# Patient Record
Sex: MALE | Race: WHITE | NOT HISPANIC OR LATINO | Employment: OTHER | URBAN - METROPOLITAN AREA
[De-identification: names, ages, dates, MRNs, and addresses within clinical notes are randomized per-mention and may not be internally consistent; named-entity substitution may affect disease eponyms.]

---

## 2019-09-13 ENCOUNTER — OFFICE VISIT (OUTPATIENT)
Dept: OPHTHALMOLOGY | Facility: CLINIC | Age: 69
End: 2019-09-13
Payer: COMMERCIAL

## 2019-09-13 DIAGNOSIS — Z01.01 ENCOUNTER FOR EXAMINATION OF EYES AND VISION WITH ABNORMAL FINDINGS: Primary | ICD-10-CM

## 2019-09-13 DIAGNOSIS — H35.3131 EARLY DRY STAGE NONEXUDATIVE AGE-RELATED MACULAR DEGENERATION OF BOTH EYES: ICD-10-CM

## 2019-09-13 DIAGNOSIS — H52.4 PRESBYOPIA: ICD-10-CM

## 2019-09-13 PROCEDURE — 92134 CPTRZ OPH DX IMG PST SGM RTA: CPT | Performed by: OPHTHALMOLOGY

## 2019-09-13 PROCEDURE — 92015 DETERMINE REFRACTIVE STATE: CPT | Performed by: OPHTHALMOLOGY

## 2019-09-13 PROCEDURE — 92004 COMPRE OPH EXAM NEW PT 1/>: CPT | Performed by: OPHTHALMOLOGY

## 2019-09-13 ASSESSMENT — VISUAL ACUITY
METHOD: SNELLEN - LINEAR
OS_SC: 20/25
OD_SC: 20/25
OS_SC+: +2

## 2019-09-13 ASSESSMENT — REFRACTION_MANIFEST
OS_AXIS: 171
OD_CYLINDER: +1.00
OD_AXIS: 180
OS_CYLINDER: +1.00
OD_ADD: +2.50
OS_ADD: +2.50
OD_SPHERE: -1.00
OS_SPHERE: -0.75

## 2019-09-13 ASSESSMENT — CONF VISUAL FIELD
OD_NORMAL: 1
OS_NORMAL: 1

## 2019-09-13 ASSESSMENT — TONOMETRY
OD_IOP_MMHG: 18
OS_IOP_MMHG: 20
IOP_METHOD: APPLANATION

## 2019-09-13 NOTE — PROGRESS NOTES
" Current Eye Medications:  None.      Subjective:  Comprehensive Eye Exam.  Patient complains of some gradual decrease in vision.  He occasionally has some floaters in his vision.  When looking at a horizontal line, he is aware of a \"dip\" in the middle, but this has been this way for many years.  He wears over-the-counter readers.  Both eyes are photophobic.  He is bothered at times by anxiety and notices his pupils become small and has a \"pressure feeling.\"  This is his first Comprehensive Eye Exam.   No family history of glaucoma or age related maculopathy..      Objective:  See Ophthalmology Exam.       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      ICD-10-CM    1. Encounter for examination of eyes and vision with abnormal findings Z01.01 REFRACTIVE STATUS   2. Presbyopia H52.4 REFRACTIVE STATUS   3. Early dry stage nonexudative age-related macular degeneration of both eyes H35.3131 EYE EXAM (SIMPLE-NONBILLABLE)     [facility] COMPUTERIZED OPHTHALMIC IMAGING RETINA        Plan:  Glasses Rx given - optional  Possible posterior vitreous detachment (sudden onset large floater and/or flashing lights) both eyes discussed.  Take a multiple vitamin or \"eye vitamin\" daily (AREDS2).  Protect your eyes outdoors from ultraviolet rays with sunglasses and/or brimmed hat.  Have spinach (cooked or raw), colorful fruits, walnuts, hazelnuts, almonds in your diet.  Monitor the vision in each eye weekly - call if any sudden persistent changes.  Baseline retinal OCT today.  Call in May 2020 for an appointment in September 2020 for Complete Exam    Dr. Yeboah [phone]      "

## 2019-09-13 NOTE — LETTER
"    9/13/2019         RE: Bahman Meeks  [address]  [facility] [address]        Dear Colleague,    Thank you for referring your patient, Bahman eMeks, to the [facility]. Please see a copy of my visit note below.     Current Eye Medications:  None.      Subjective:  Comprehensive Eye Exam.  Patient complains of some gradual decrease in vision.  He occasionally has some floaters in his vision.  When looking at a horizontal line, he is aware of a \"dip\" in the middle, but this has been this way for many years.  He wears over-the-counter readers.  Both eyes are photophobic.  He is bothered at times by anxiety and notices his pupils become small and has a \"pressure feeling.\"  This is his first Comprehensive Eye Exam.   No family history of glaucoma or age related maculopathy..      Objective:  See Ophthalmology Exam.       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      ICD-10-CM    1. Encounter for examination of eyes and vision with abnormal findings Z01.01 REFRACTIVE STATUS   2. Presbyopia H52.4 REFRACTIVE STATUS   3. Early dry stage nonexudative age-related macular degeneration of both eyes H35.3131 EYE EXAM (SIMPLE-NONBILLABLE)     HC COMPUTERIZED OPHTHALMIC IMAGING RETINA        Plan:  Glasses Rx given - optional  Possible posterior vitreous detachment (sudden onset large floater and/or flashing lights) both eyes discussed.  Take a multiple vitamin or \"eye vitamin\" daily (AREDS2).  Protect your eyes outdoors from ultraviolet rays with sunglasses and/or brimmed hat.  Have spinach (cooked or raw), colorful fruits, walnuts, hazelnuts, almonds in your diet.  Monitor the vision in each eye weekly - call if any sudden persistent changes.  Baseline retinal OCT today.  Call in May 2020 for an appointment in September 2020 for Complete Exam    Dr. Yeboah [phone]        Again, thank you for allowing me to participate in the care of your " patient.        Sincerely,        Freedom Yeboah MD

## 2019-09-15 PROBLEM — H35.3131 EARLY DRY STAGE NONEXUDATIVE AGE-RELATED MACULAR DEGENERATION OF BOTH EYES: Status: ACTIVE | Noted: 2019-09-15

## 2019-09-15 ASSESSMENT — EXTERNAL EXAM - LEFT EYE: OS_EXAM: NORMAL

## 2019-09-15 ASSESSMENT — CUP TO DISC RATIO
OS_RATIO: 0.3
OD_RATIO: 0.3

## 2019-09-15 ASSESSMENT — EXTERNAL EXAM - RIGHT EYE: OD_EXAM: NORMAL

## 2019-09-15 ASSESSMENT — SLIT LAMP EXAM - LIDS: COMMENTS: HIGH CREASE
